# Patient Record
Sex: FEMALE | Race: ASIAN | Employment: UNEMPLOYED | URBAN - METROPOLITAN AREA
[De-identification: names, ages, dates, MRNs, and addresses within clinical notes are randomized per-mention and may not be internally consistent; named-entity substitution may affect disease eponyms.]

---

## 2017-01-04 ENCOUNTER — OFFICE VISIT (OUTPATIENT)
Dept: INTERNAL MEDICINE CLINIC | Age: 57
End: 2017-01-04

## 2017-01-04 VITALS
RESPIRATION RATE: 16 BRPM | WEIGHT: 151.2 LBS | TEMPERATURE: 97.8 F | DIASTOLIC BLOOD PRESSURE: 92 MMHG | BODY MASS INDEX: 30.48 KG/M2 | HEART RATE: 67 BPM | HEIGHT: 59 IN | SYSTOLIC BLOOD PRESSURE: 142 MMHG

## 2017-01-04 DIAGNOSIS — R73.02 IGT (IMPAIRED GLUCOSE TOLERANCE): ICD-10-CM

## 2017-01-04 DIAGNOSIS — E78.5 HYPERLIPIDEMIA, UNSPECIFIED HYPERLIPIDEMIA TYPE: Primary | ICD-10-CM

## 2017-01-04 NOTE — PROGRESS NOTES
RM 15  Pt presents today with her mom  Pt is not fasting    Chief Complaint   Patient presents with    Cholesterol Problem     follow up    Diabetes     pre-diabetes       1. Have you been to the ER, urgent care clinic since your last visit? Hospitalized since your last visit? No    2. Have you seen or consulted any other health care providers outside of the [address] [place] [address] since your last visit? Include any pap smears or colon screening. No    There are no preventive care reminders to display for this patient.

## 2017-01-04 NOTE — PROGRESS NOTES
HISTORY OF PRESENT ILLNESS  Elidia Kapoor is a 49 y.o. female. HPI  Presents for f/u IGT, GERD, lipids    Pt denies any further chest pain sx    Did not take the PPI, though. Right ankle/foot injury several years ago - pain and swelling  Never fully resolved   Intermittently feels pulsation or mild pain  But, no limitation in daily function    Past medical, Social, and Family history reviewed  Medications reviewed and updated. ROS  Complete ROS reviewed and negative or stable except as noted in HPI. Physical Exam   Constitutional: She is oriented to person, place, and time. She appears well-nourished. No distress. HENT:   Head: Normocephalic and atraumatic. Eyes: EOM are normal. Pupils are equal, round, and reactive to light. No scleral icterus. Neck: Normal range of motion. Neck supple. No JVD present. Cardiovascular: Normal rate, regular rhythm and normal heart sounds. Exam reveals no gallop and no friction rub. No murmur heard. Pulmonary/Chest: Effort normal and breath sounds normal. No respiratory distress. She has no wheezes. She has no rales. Abdominal: Soft. Bowel sounds are normal. She exhibits no distension. There is no tenderness. Musculoskeletal: Normal range of motion. She exhibits no edema. Lymphadenopathy:     She has no cervical adenopathy. Neurological: She is alert and oriented to person, place, and time. She exhibits normal muscle tone. Skin: Skin is warm. No rash noted. Psychiatric: She has a normal mood and affect. Nursing note and vitals reviewed. Prior labs reviewed with pt    ASSESSMENT and PLAN    ICD-10-CM ICD-9-CM    1. Hyperlipidemia, unspecified hyperlipidemia type E78.5 272.4    2. IGT (impaired glucose tolerance) R73.02 790.22    3. Elevated blood pressure I10 401.9      Follow-up Disposition:  Return in about 4 months (around 5/4/2017) for blood pressure, cholesterol, pre-diabetes.    results and schedule of future studies reviewed with patient  reviewed diet, exercise and weight  l  cardiovascular risk and specific lipid/LDL goals reviewed  reviewed medications and side effects in detail   Ok to hold PPI for now  If CP recurs will need to try PPI and consider cardiac stress testing  Encouraged diabetic and low saturated fat diet  Agree to TLC and hold on meds for lipids, HTN, or DM today but reviewed that each of these must be improved or med needed.   Consider ortho referral if foot/ankle symptoms worsen

## 2017-01-04 NOTE — MR AVS SNAPSHOT
Visit Information Date & Time Provider Department Dept. Phone Encounter #  
 1/4/2017 10:30 AM Shira Cornelius MD [facility] Pediatrics and Internal Medicine [phone] [id number] Follow-up Instructions Return in about 4 months (around 5/4/2017) for blood pressure, cholesterol, pre-diabetes. Upcoming Health Maintenance Date Due  
 PAP AKA CERVICAL CYTOLOGY 6/11/2018 BREAST CANCER SCRN MAMMOGRAM 12/14/2018 COLONOSCOPY 8/7/2025 DTaP/Tdap/Td series (2 - Td) 11/23/2026 Allergies as of 1/4/2017  Review Complete On: 1/4/2017 By: Shira Cornelius MD  
 No Known Allergies Current Immunizations  Reviewed on 1/4/2017 No immunizations on file. Reviewed by Shira Cornelius MD on 1/4/2017 at 12:03 PM  
You Were Diagnosed With   
  
 Codes Comments Hyperlipidemia, unspecified hyperlipidemia type    -  Primary ICD-10-CM: E78.5 ICD-9-CM: 272.4 IGT (impaired glucose tolerance)     ICD-10-CM: R73.02 
ICD-9-CM: 790.22 Elevated blood pressure     ICD-10-CM: I10 
ICD-9-CM: 401.9 Vitals BP Pulse Temp Resp Height(growth percentile) Weight(growth percentile) (!) 142/92 67 97.8 °F (36.6 °C) (Oral) 16 4' 11\" (1.499 m) 151 lb 3.2 oz (68.6 kg) BMI OB Status Smoking Status 30.54 kg/m2 Menopause Never Smoker Vitals History BMI and BSA Data Body Mass Index Body Surface Area 30.54 kg/m 2 1.69 m 2 Preferred Pharmacy Pharmacy Name Phone [facility] [phone] Your Updated Medication List  
  
   
This list is accurate as of: 1/4/17 12:09 PM.  Always use your most recent med list.  
  
  
  
  
 Omeprazole delayed release 20 mg tablet Commonly known as:  PRILOSEC D/R Take 1 Tab by mouth daily. vit B Cmplx 3-FA-Vit C-Biotin 1- mg-mg-mcg tablet Commonly known as:  NEPHRO KASSIDY RX Take 1 Tab by mouth daily. Follow-up Instructions Return in about 4 months (around 5/4/2017) for blood pressure, cholesterol, pre-diabetes. Introducing [facility] & HEALTH SERVICES! Ivy Mitchell introduces [employer] patient portal. Now you can access parts of your medical record, email your doctor's office, and request medication refills online. 1. In your internet browser, go to [email or web address] 2. Click on the First Time User? Click Here link in the Sign In box. You will see the New Member Sign Up page. 3. Enter your Rec[employer]lata Access Code exactly as it appears below. You will not need to use this code after youve completed the sign-up process. If you do not sign up before the expiration date, you must request a new code. · Rec[employer]ata Access Code: [email or web address] Expires: 2/21/2017  9:41 AM 
 
4. Enter the last four digits of your Social Security Number (xxxx) and Date of Birth (mm/dd/yyyy) as indicated and click Submit. You will be taken to the next sign-up page. 5. Create a Reciclata ID. This will be your Rec[employer]lata login ID and cannot be changed, so think of one that is secure and easy to remember. 6. Create a Rec[employer]lata password. You can change your password at any time. 7. Enter your Password Reset Question and Answer. This can be used at a later time if you forget your password. 8. Enter your e-mail address. You will receive e-mail notification when new information is available in [address] 9. Click Sign Up. You can now view and download portions of your medical record. 10. Click the Download Summary menu link to download a portable copy of your medical information. If you have questions, please visit the Frequently Asked Questions section of the [employer] website. Remember, [employer]ata is NOT to be used for urgent needs. For medical emergencies, dial 911. Now available from your iPhone and Android! Please provide this summary of care documentation to your next provider. Your primary care clinician is listed as [address] If you have any questions after today's visit, please call [phone].

## 2018-10-02 ENCOUNTER — OFFICE VISIT (OUTPATIENT)
Dept: INTERNAL MEDICINE CLINIC | Age: 58
End: 2018-10-02

## 2018-10-02 VITALS
HEIGHT: 59 IN | RESPIRATION RATE: 16 BRPM | SYSTOLIC BLOOD PRESSURE: 140 MMHG | HEART RATE: 66 BPM | DIASTOLIC BLOOD PRESSURE: 83 MMHG | WEIGHT: 148 LBS | OXYGEN SATURATION: 96 % | TEMPERATURE: 97.8 F | BODY MASS INDEX: 29.84 KG/M2

## 2018-10-02 DIAGNOSIS — R92.8 ABNORMAL MAMMOGRAM: ICD-10-CM

## 2018-10-02 DIAGNOSIS — R03.0 ELEVATED BLOOD PRESSURE READING: ICD-10-CM

## 2018-10-02 DIAGNOSIS — K21.9 GASTROESOPHAGEAL REFLUX DISEASE, ESOPHAGITIS PRESENCE NOT SPECIFIED: ICD-10-CM

## 2018-10-02 DIAGNOSIS — K55.20 AVM (ARTERIOVENOUS MALFORMATION) OF COLON: ICD-10-CM

## 2018-10-02 DIAGNOSIS — R73.02 IGT (IMPAIRED GLUCOSE TOLERANCE): ICD-10-CM

## 2018-10-02 DIAGNOSIS — E78.5 HYPERLIPIDEMIA, UNSPECIFIED HYPERLIPIDEMIA TYPE: ICD-10-CM

## 2018-10-02 DIAGNOSIS — Z82.49 FAMILY HISTORY OF THROMBOSIS IN FIRST DEGREE RELATIVE: ICD-10-CM

## 2018-10-02 DIAGNOSIS — R07.9 CHEST PAIN, UNSPECIFIED TYPE: ICD-10-CM

## 2018-10-02 DIAGNOSIS — Z00.00 WELL WOMAN EXAM (NO GYNECOLOGICAL EXAM): Primary | ICD-10-CM

## 2018-10-02 DIAGNOSIS — Z82.49 FAMILY HISTORY OF CEREBRAL ANEURYSM: ICD-10-CM

## 2018-10-02 RX ORDER — PHENOL/SODIUM PHENOLATE
20 AEROSOL, SPRAY (ML) MUCOUS MEMBRANE DAILY
Qty: 90 TAB | Refills: 1 | Status: SHIPPED | OUTPATIENT
Start: 2018-10-02

## 2018-10-02 NOTE — PROGRESS NOTES
Subjective:  
66 y.o. female for Well Woman Check. Her gyne and breast care is done elsewhere by her Ob-Gyne physician. Encounter facilitated by telephone  - [address] Past medical, Social, and Family history reviewed Medications reviewed and updated. ROS: Feeling generally well. No TIA's or unusual headaches, no dysphagia. No prolonged cough. No dyspnea or chest pain on exertion. No abdominal pain, change in bowel habits, black or bloody stools. No urinary tract symptoms. No new or unusual musculoskeletal symptoms. Specific concerns today:  
Reviewed PAP rec's - pt had normal PAP and neg HPV testing 2015 Pt inquires re: family hx blood clots Pt reports father  from blood clot in the brain Brother dx blood clot in the brain as well 2 yrs ago Unclear underlying etiology Pt also inquires re: heart testing 1-2 times per year develops chest pain Objective: The patient appears well, alert, oriented x 3, in no distress. Visit Vitals  /83 (BP 1 Location: Left arm, BP Patient Position: Sitting)  Pulse 66  Temp 97.8 °F (36.6 °C) (Oral)  Resp 16  
 Ht 4' 11\" (1.499 m) Comment: per chart  Wt 148 lb (67.1 kg)  SpO2 96%  BMI 29.89 kg/m2 ENT normal.  Neck supple. No adenopathy or thyromegaly. YENNY. Lungs are clear, good air entry, no wheezes, rhonchi or rales. S1 and S2 normal, no murmurs, regular rate and rhythm. Abdomen soft without tenderness, guarding, mass or organomegaly. Extremities show no edema, normal peripheral pulses. Neurological is normal, no focal findings. Breast and Pelvic exams are deferred. Cherry angiomas, spider veins Prior labs reviewed. Assessment/Plan:  
Well Woman 
follow low fat diet, routine labs ordered, call if any problems ICD-10-CM ICD-9-CM 1.  Well woman exam (no gynecological exam) Z00.00 V70.0 CBC WITH AUTOMATED DIFF  
   HEMOGLOBIN A1C WITH EAG  
   LIPID PANEL  
 METABOLIC PANEL, COMPREHENSIVE  
   VITAMIN D, 25 HYDROXY  
 [V70.0] 2. Hyperlipidemia, unspecified hyperlipidemia type E78.5 272.4 LIPID PANEL  
3. IGT (impaired glucose tolerance) R73.02 790.22 HEMOGLOBIN A1C WITH EAG  
4. Family history of thrombosis in first degree relative Z83.2 V18.3 JAK2 MUTATION ANALYSIS  
   FACTOR V LEIDEN  
   PROTEIN S PANEL  
   PROTEIN C DEFICIENCY  
   ANTITHROMBIN III ACTIVITY FACTOR II  DNA ANALYSIS  
   ANTIPHOSPHOLIPID SYNDROME PANEL  
   PROTHROMBIN TIME + INR  
   PTT 5. Family history of cerebral aneurysm Z82.49 V17.1 MRA BRAIN WO CONT 6. Chest pain, unspecified type R07.9 786.50 AMB POC EKG ROUTINE W/ 12 LEADS, INTER & REP  
   XR CHEST PA LAT  
   ECHO TTE STRESS EXERCISE TREADMILL COMP 7. Gastroesophageal reflux disease, esophagitis presence not specified K21.9 530.81 Omeprazole delayed release (PRILOSEC D/R) 20 mg tablet 8. 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   
9. Abnormal mammogram R92.8 793.80   
10. Elevated blood pressure reading R03.0 796.2 Follow-up Disposition: 
Return in about 2 months (around 12/2/2018), or if symptoms worsen or fail to improve, for chest pain, results. results and schedule of future studies reviewed with patient 
reviewed diet, exercise and weight  
cardiovascular risk and specific lipid/LDL goals reviewed 
reviewed medications and side effects in detail Family hx of clot vs aneurysm  
hypercoag w/u MRA brain Declines flu shot Reviewed Shingrix - but out of stock

## 2018-10-02 NOTE — PROGRESS NOTES
Rm 14 Chief Complaint Patient presents with  Complete Physical  
pt is fasting 1. Have you been to the ER, urgent care clinic since your last visit? Hospitalized since your last visit? No 
 
2. Have you seen or consulted any other health care providers outside of the [address] [place] [address] since your last visit? Include any pap smears or colon screening. No 
 
Health Maintenance Due Topic Date Due  Shingrix Vaccine Age 50> (1 of 2) 08/27/2012  PAP AKA CERVICAL CYTOLOGY  06/11/2018  Influenza Age 5 to Adult  08/01/2018  
pt declines flu vaccine today No recent pap smear Mammogram done 6/2018 per pt [facility] PHQ over the last two weeks 10/2/2018 Little interest or pleasure in doing things Not at all Feeling down, depressed, irritable, or hopeless Not at all Total Score PHQ 2 0 Learning Assessment 10/2/2018 PRIMARY LEARNER Patient HIGHEST LEVEL OF EDUCATION - PRIMARY LEARNER  -  
BARRIERS PRIMARY LEARNER LANGUAGE  
CO-LEARNER CAREGIVER No  
PRIMARY LANGUAGE CHINESE (MANDARIN) LEARNER PREFERENCE PRIMARY LISTENING  
ANSWERED BY patient RELATIONSHIP SELF

## 2018-10-02 NOTE — MR AVS SNAPSHOT
[address] [place] [address] [address] [place] [id number] 
[phone] Patient: Keshia Lloyd MRN: [id number] :1960 Visit Information Date & Time Provider Department Dept. Phone Encounter #  
 10/2/2018  9:30 AM Bry Borrego MD [facility] Pediatrics and Internal Medicine [phone] [id number] Follow-up Instructions Return in about 2 months (around 2018), or if symptoms worsen or fail to improve, for chest pain, results. Upcoming Health Maintenance Date Due Shingrix Vaccine Age 50> (1 of 2) 2012 Influenza Age 5 to Adult 2019* PAP AKA CERVICAL CYTOLOGY 2020 BREAST CANCER SCRN MAMMOGRAM 2020 COLONOSCOPY 2025 DTaP/Tdap/Td series (2 - Td) 2026 *Topic was postponed. The date shown is not the original due date. Allergies as of 10/2/2018  Review Complete On: 10/2/2018 By: Bry Borrego MD  
 No Known Allergies Current Immunizations  Reviewed on 10/2/2018 No immunizations on file. Reviewed by Bry Borrego MD on 10/2/2018 at 11:03 AM  
You Were Diagnosed With   
  
 Codes Comments Well woman exam (no gynecological exam)    -  Primary ICD-10-CM: Z00.00 ICD-9-CM: V70.0 [V70.0] Hyperlipidemia, unspecified hyperlipidemia type     ICD-10-CM: E78.5 ICD-9-CM: 272.4 IGT (impaired glucose tolerance)     ICD-10-CM: R73.02 
ICD-9-CM: 790.22 Family history of thrombosis in first degree relative     ICD-10-CM: Z83.2 ICD-9-CM: V18.3 Family history of cerebral aneurysm     ICD-10-CM: Z82.49 
ICD-9-CM: V17.1 Chest pain, unspecified type     ICD-10-CM: R07.9 ICD-9-CM: 786.50 Gastroesophageal reflux disease, esophagitis presence not specified     ICD-10-CM: K21.9 ICD-9-CM: 530.81 AVM (arteriovenous malformation) of colon     ICD-10-CM: Q27.33 
ICD-9-CM: 747.61 Abnormal mammogram     ICD-10-CM: R92.8 ICD-9-CM: 793.80 Elevated blood pressure reading     ICD-10-CM: R03.0 ICD-9-CM: 796.2 Vitals BP Pulse Temp Resp Height(growth percentile) Weight(growth percentile) 140/83 (BP 1 Location: Left arm, BP Patient Position: Sitting) 66 97.8 °F (36.6 °C) (Oral) 16 4' 11\" (1.499 m) 148 lb (67.1 kg) SpO2 BMI OB Status Smoking Status 96% 29.89 kg/m2 Menopause Never Smoker Vitals History BMI and BSA Data Body Mass Index Body Surface Area  
 29.89 kg/m 2 1.67 m 2 Preferred Pharmacy Pharmacy Name Phone [facility] AT [address] [place] [address] Drive [phone] Your Updated Medication List  
  
   
This list is accurate as of 10/2/18 11:50 AM.  Always use your most recent med list.  
  
  
  
  
 Omeprazole delayed release 20 mg tablet Commonly known as:  PRILOSEC D/R Take 1 Tab by mouth daily. vit B Cmplx 3-FA-Vit C-Biotin 1- mg-mg-mcg tablet Commonly known as:  NEPHRO KASSIDY RX Take 1 Tab by mouth daily. Prescriptions Sent to Pharmacy Refills Omeprazole delayed release (PRILOSEC D/R) 20 mg tablet 1 Sig: Take 1 Tab by mouth daily. Class: Normal  
 Pharmacy: [facility] [address] AT [address] [place] [address] Drive P Ph #: [phone] Route: Oral  
  
We Performed the Following AMB POC EKG ROUTINE W/ 12 LEADS, INTER & REP [46151 CPT(R)] ANTIPHOSPHOLIPID SYNDROME PANEL [[id number] Custom] ANTITHROMBIN III ACTIVITY [id number] CPT(R)] CBC WITH AUTOMATED DIFF [06810 CPT(R)] FACTOR II  DNA ANALYSIS [[id number] Custom] FACTOR V LEIDEN J1802556 CPT(R)] HEMOGLOBIN A1C WITH EAG [62780 CPT(R)] JAK2 MUTATION ANALYSIS [[id number] Custom] LIPID PANEL [25164 CPT(R)] METABOLIC PANEL, COMPREHENSIVE [93031 CPT(R)] PROTEIN C DEFICIENCY [[id number] Custom] PROTEIN S PANEL [id number] CPT(R)] PROTHROMBIN TIME + INR [71078 CPT(R)] PTT C5925415 CPT(R)] VITAMIN D, 25 HYDROXY [id number] CPT(R)] Follow-up Instructions Return in about 2 months (around 12/2/2018), or if symptoms worsen or fail to improve, for chest pain, results. To-Do List   
 10/03/2018 Imaging:  XR CHEST PA LAT   
  
 10/09/2018 Imaging:  MRA BRAIN WO CONT   
  
 10/16/2018 ECHO:  ECHO TTE STRESS EXERCISE TREADMILL COMP Introducing \[facility]\"" & HEALTH SERVICES! Ariana Velez introduces [employer] patient portal. Now you can access parts of your medical record, email your doctor's office, and request medication refills online. 1. In your internet browser, go to [email or web address] 2. Click on the First Time User? Click Here link in the Sign In box. You will see the New Member Sign Up page. 3. Enter your [employer] Access Code exactly as it appears below. You will not need to use this code after youve completed the sign-up process. If you do not sign up before the expiration date, you must request a new code. · [employer] Access Code: [email or web address] Expires: 12/31/2018  9:49 AM 
 
4. Enter the last four digits of your Social Security Number (xxxx) and Date of Birth (mm/dd/yyyy) as indicated and click Submit. You will be taken to the next sign-up page. 5. Create a [employer] ID. This will be your [employer] login ID and cannot be changed, so think of one that is secure and easy to remember. 6. Create a [employer] password. You can change your password at any time. 7. Enter your Password Reset Question and Answer. This can be used at a later time if you forget your password. 8. Enter your e-mail address. You will receive e-mail notification when new information is available in [address] 9. Click Sign Up. You can now view and download portions of your medical record. 10. Click the Download Summary menu link to download a portable copy of your medical information. If you have questions, please visit the Frequently Asked Questions section of the [employer] website. Remember, [employer] is NOT to be used for urgent needs. For medical emergencies, dial 911. Now available from your iPhone and Android! Please provide this summary of care documentation to your next provider. Your primary care clinician is listed as [address] If you have any questions after today's visit, please call [phone].

## 2018-10-02 NOTE — PROGRESS NOTES
HPI: 
Presents for f/u chest pain, other Encounter facilitated by telephone  - [address] Chest pain Drinking warm water resolves the sx Pt concerned re: heart 
 
fam hx cerebral thrombosis vs aneurysm - father and brother Pt inquires re: testing to clarify her risk. Past medical, Social, and Family history reviewed Prior to Admission medications Medication Sig Start Date End Date Taking? Authorizing Provider Omeprazole delayed release (PRILOSEC D/R) 20 mg tablet Take 1 Tab by mouth daily. 11/23/16  Yes Agustin Taylor MD  
vit B Cmplx 3-FA-Vit C-Biotin (NEPHRO KASSIDY RX) 1- mg-mg-mcg tablet Take 1 Tab by mouth daily. Yes Historical Provider ROS Complete ROS reviewed and negative or stable except as noted in HPI. Physical Exam  
Constitutional: She is oriented to person, place, and time. She appears well-nourished. No distress. HENT:  
Head: Normocephalic and atraumatic. Eyes: EOM are normal. Pupils are equal, round, and reactive to light. No scleral icterus. Neck: Normal range of motion. Neck supple. No JVD present. Cardiovascular: Normal rate, regular rhythm and normal heart sounds. Exam reveals no gallop and no friction rub. No murmur heard. Pulmonary/Chest: Effort normal and breath sounds normal. No respiratory distress. She has no wheezes. She has no rales. Abdominal: Soft. Bowel sounds are normal. She exhibits no distension. There is no tenderness. Musculoskeletal: Normal range of motion. She exhibits no edema. Lymphadenopathy:  
  She has no cervical adenopathy. Neurological: She is alert and oriented to person, place, and time. She exhibits normal muscle tone. Skin: Skin is warm. No rash noted. Spiders veins, cherry angiomas, birthmark (vascular) on right forearm. Psychiatric: She has a normal mood and affect. Nursing note and vitals reviewed. Prior labs reviewed. Assessment/Plan: ICD-10-CM ICD-9-CM 1. Chest pain, unspecified type R07.9 786.50 AMB POC EKG ROUTINE W/ 12 LEADS, INTER & REP  
   XR CHEST PA LAT  
   ECHO TTE STRESS EXERCISE TREADMILL COMP 2. Hyperlipidemia, unspecified hyperlipidemia type E78.5 272.4 LIPID PANEL  
3. IGT (impaired glucose tolerance) R73.02 790.22 HEMOGLOBIN A1C WITH EAG 4. Well woman exam (no gynecological exam) Z00.00 V70.0 CBC WITH AUTOMATED DIFF  
   HEMOGLOBIN A1C WITH EAG  
   LIPID PANEL  
   METABOLIC PANEL, COMPREHENSIVE  
   VITAMIN D, 25 HYDROXY  
 [V70.0] 5. Family history of thrombosis in first degree relative Z83.2 V18.3 JAK2 MUTATION ANALYSIS  
   FACTOR V LEIDEN  
   PROTEIN S PANEL  
   PROTEIN C DEFICIENCY  
   ANTITHROMBIN III ACTIVITY FACTOR II  DNA ANALYSIS  
   ANTIPHOSPHOLIPID SYNDROME PANEL  
   PROTHROMBIN TIME + INR  
   PTT 6. Family history of cerebral aneurysm Z82.49 V17.1 MRA BRAIN WO CONT 7. Gastroesophageal reflux disease, esophagitis presence not specified K21.9 530.81 Omeprazole delayed release (PRILOSEC D/R) 20 mg tablet 8. 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   
9. Abnormal mammogram R92.8 793.80   
10. Elevated blood pressure reading R03.0 796.2 Follow-up Disposition: 
Return in about 2 months (around 12/2/2018), or if symptoms worsen or fail to improve, for chest pain, results. results and schedule of future studies reviewed with patient 
reviewed diet, exercise and weight 
cardiovascular risk and specific lipid/LDL goals reviewed 
reviewed medications and side effects in detail Stress echo PPI rec'd Reviewed pathophysiology of GERD and esophageal spasm EKG CXR 
hypercoag w/u MRA brain Likely will need HTN, lipid, and DM meds at some point - defer today >40 minutes time spent with >50% in counseling and coordination of care

## 2018-10-03 LAB — SPECIMEN STATUS REPORT, ROLRST: NORMAL

## 2018-10-10 LAB
25(OH)D3+25(OH)D2 SERPL-MCNC: 28.5 NG/ML (ref 30–100)
ALBUMIN SERPL-MCNC: 4.8 G/DL (ref 3.5–5.5)
ALBUMIN/GLOB SERPL: 1.9 {RATIO} (ref 1.2–2.2)
ALP SERPL-CCNC: 76 IU/L (ref 39–117)
ALT SERPL-CCNC: 17 IU/L (ref 0–32)
APTT HEX PL PPP: NORMAL SEC
APTT IMM NP PPP: NORMAL SEC
APTT PPP 1:1 SALINE: NORMAL SEC
APTT PPP: 30 SEC (ref 24–33)
APTT PPP: NORMAL SEC
AST SERPL-CCNC: 23 IU/L (ref 0–40)
AT III ACT/NOR PPP CHRO: NORMAL %
B2 GLYCOPROT1 IGA SER-ACNC: NORMAL SAU
B2 GLYCOPROT1 IGG SER-ACNC: NORMAL SGU
B2 GLYCOPROT1 IGM SER-ACNC: NORMAL SMU
BACKGROUND: 480503: NORMAL
BASOPHILS # BLD AUTO: 0 X10E3/UL (ref 0–0.2)
BASOPHILS NFR BLD AUTO: 1 %
BILIRUB SERPL-MCNC: 0.6 MG/DL (ref 0–1.2)
BUN SERPL-MCNC: 11 MG/DL (ref 6–24)
BUN/CREAT SERPL: 20 (ref 9–23)
CALCIUM SERPL-MCNC: 9.5 MG/DL (ref 8.7–10.2)
CARDIOLIPIN IGA SER IA-ACNC: NORMAL APL
CARDIOLIPIN IGG SER IA-ACNC: NORMAL GPL
CARDIOLIPIN IGM SER IA-ACNC: NORMAL MPL
CHLORIDE SERPL-SCNC: 103 MMOL/L (ref 96–106)
CHOLEST SERPL-MCNC: 207 MG/DL (ref 100–199)
CO2 SERPL-SCNC: 19 MMOL/L (ref 20–29)
CONFIRM DRVVT: NORMAL SEC
CREAT SERPL-MCNC: 0.56 MG/DL (ref 0.57–1)
DRVVT SCREEN TO CONFIRM RATIO: NORMAL RATIO
EOSINOPHIL # BLD AUTO: 0.1 X10E3/UL (ref 0–0.4)
EOSINOPHIL NFR BLD AUTO: 1 %
ERYTHROCYTE [DISTWIDTH] IN BLOOD BY AUTOMATED COUNT: 14.2 % (ref 12.3–15.4)
EST. AVERAGE GLUCOSE BLD GHB EST-MCNC: 128 MG/DL
F2 GENE MUT ANL BLD/T: NORMAL
F5 GENE MUT ANL BLD/T: NORMAL
GLOBULIN SER CALC-MCNC: 2.5 G/DL (ref 1.5–4.5)
GLUCOSE SERPL-MCNC: 106 MG/DL (ref 65–99)
HBA1C MFR BLD: 6.1 % (ref 4.8–5.6)
HCT VFR BLD AUTO: 39.1 % (ref 34–46.6)
HDLC SERPL-MCNC: 58 MG/DL
HGB BLD-MCNC: 12.3 G/DL (ref 11.1–15.9)
IMM GRANULOCYTES # BLD: 0 X10E3/UL (ref 0–0.1)
IMM GRANULOCYTES NFR BLD: 0 %
INR PPP: 1 (ref 0.8–1.2)
JAK2 P.V617F BLD/T QL: NORMAL
LA NT PLATELET PPP: NORMAL SEC
LA PPP-IMP: NORMAL
LAB DIRECTOR NAME PROVIDER: NORMAL
LDLC SERPL CALC-MCNC: 132 MG/DL (ref 0–99)
LYMPHOCYTES # BLD AUTO: 1.4 X10E3/UL (ref 0.7–3.1)
LYMPHOCYTES NFR BLD AUTO: 35 %
MCH RBC QN AUTO: 28.7 PG (ref 26.6–33)
MCHC RBC AUTO-ENTMCNC: 31.5 G/DL (ref 31.5–35.7)
MCV RBC AUTO: 91 FL (ref 79–97)
MONOCYTES # BLD AUTO: 0.3 X10E3/UL (ref 0.1–0.9)
MONOCYTES NFR BLD AUTO: 8 %
NEUTROPHILS # BLD AUTO: 2.2 X10E3/UL (ref 1.4–7)
NEUTROPHILS NFR BLD AUTO: 55 %
PLATELET # BLD AUTO: 186 X10E3/UL (ref 150–379)
POTASSIUM SERPL-SCNC: 3.7 MMOL/L (ref 3.5–5.2)
PROT C ACT/NOR PPP: NORMAL %
PROT C AG ACT/NOR PPP IA: NORMAL %
PROT S ACT/NOR PPP: NORMAL %
PROT S AG ACT/NOR PPP IA: NORMAL %
PROT S FREE AG ACT/NOR PPP IA: NORMAL %
PROT SERPL-MCNC: 7.3 G/DL (ref 6–8.5)
PROTHROM IGG SERPL-ACNC: NORMAL G UNITS
PROTHROMBIN TIME: 10.3 SEC (ref 9.1–12)
PS IGG SER IA-ACNC: NORMAL GPS
PS IGM SER IA-ACNC: NORMAL MPS
RBC # BLD AUTO: 4.28 X10E6/UL (ref 3.77–5.28)
SCREEN DRVVT: NORMAL SEC
SODIUM SERPL-SCNC: 141 MMOL/L (ref 134–144)
TRIGL SERPL-MCNC: 83 MG/DL (ref 0–149)
VLDLC SERPL CALC-MCNC: 17 MG/DL (ref 5–40)
WBC # BLD AUTO: 4 X10E3/UL (ref 3.4–10.8)

## 2018-10-24 NOTE — PROGRESS NOTES
Please notify pt of results HgbA1C at 6.1 = pre-diabetes range. Continue a low carb, no sugar added diet. No medication needed for blood sugar at this time. LDL cholesterol at 132 is too high for someone with pre-diabetes. Should be < 100. If a low saturated fat diet and exercise can not lower it, then she should consider a cholesterol lowering medication. Vitamin D is a little low at 28.5 (normal is greater than 30). Increase her daily vitamin D by 1000 units per day. The blood clotting labs that were performed are normal.  Several labs were not processed due to error in collection. Offer that she either return for these or may go to [facility] for them to complete the workup. (see labs listed as cancelled) Verify that the  collects properly with the repeat blood draw.

## 2018-12-06 ENCOUNTER — OFFICE VISIT (OUTPATIENT)
Dept: INTERNAL MEDICINE CLINIC | Age: 58
End: 2018-12-06

## 2018-12-06 VITALS
RESPIRATION RATE: 16 BRPM | HEART RATE: 71 BPM | HEIGHT: 59 IN | BODY MASS INDEX: 30.28 KG/M2 | SYSTOLIC BLOOD PRESSURE: 137 MMHG | OXYGEN SATURATION: 98 % | TEMPERATURE: 97.7 F | WEIGHT: 150.2 LBS | DIASTOLIC BLOOD PRESSURE: 83 MMHG

## 2018-12-06 DIAGNOSIS — R03.0 ELEVATED BLOOD PRESSURE READING: ICD-10-CM

## 2018-12-06 DIAGNOSIS — K55.20 AVM (ARTERIOVENOUS MALFORMATION) OF COLON: ICD-10-CM

## 2018-12-06 DIAGNOSIS — K21.9 GASTROESOPHAGEAL REFLUX DISEASE, ESOPHAGITIS PRESENCE NOT SPECIFIED: ICD-10-CM

## 2018-12-06 DIAGNOSIS — Z82.49 FAMILY HISTORY OF CEREBRAL ANEURYSM: ICD-10-CM

## 2018-12-06 DIAGNOSIS — E78.5 HYPERLIPIDEMIA, UNSPECIFIED HYPERLIPIDEMIA TYPE: ICD-10-CM

## 2018-12-06 DIAGNOSIS — Z82.49 FAMILY HISTORY OF THROMBOEMBOLIC DISEASE: ICD-10-CM

## 2018-12-06 DIAGNOSIS — R73.02 IGT (IMPAIRED GLUCOSE TOLERANCE): ICD-10-CM

## 2018-12-06 DIAGNOSIS — R07.9 CHEST PAIN, UNSPECIFIED TYPE: Primary | ICD-10-CM

## 2018-12-06 DIAGNOSIS — R06.83 SNORING: ICD-10-CM

## 2018-12-06 DIAGNOSIS — R06.81 WITNESSED APNEIC SPELLS: ICD-10-CM

## 2018-12-06 RX ORDER — PHENOL/SODIUM PHENOLATE
20 AEROSOL, SPRAY (ML) MUCOUS MEMBRANE DAILY
Qty: 30 TAB | Refills: 5 | Status: SHIPPED | OUTPATIENT
Start: 2018-12-06

## 2018-12-06 NOTE — PATIENT INSTRUCTIONS
HgbA1C at 6.1 = pre-diabetes range.  Continue a low carb, no sugar added diet.  No medication needed for blood sugar at this time. LDL cholesterol at 132 is too high for someone with pre-diabetes.  Should be < 100.  If a low saturated fat diet and exercise can not lower it, then she should consider a cholesterol lowering medication. Vitamin D is a little low at 28.5 (normal is greater than 30).  Increase her daily vitamin D by 1000 units per day.

## 2018-12-06 NOTE — LETTER
12/6/2018 12:56 PM 
 
Ms. Freddie Whaley [address] [id number] List of current medication Prior to Admission medications Medication Sig Start Date End Date Taking? Authorizing Provider Omeprazole delayed release (PRILOSEC D/R) 20 mg tablet Take 1 Tab by mouth daily. 12/6/18  Yes Zachariah Valdez MD  
Omeprazole delayed release (PRILOSEC D/R) 20 mg tablet Take 1 Tab by mouth daily. 10/2/18  Yes Zachariah Valdez MD  
vit B Cmplx 3-FA-Vit C-Biotin (NEPHRO KASSIDY RX) 1- mg-mg-mcg tablet Take 1 Tab by mouth daily. Yes Provider, Historical  
 
 
 
 
 
 
 
Sincerely, Damir Mcnamara MD

## 2018-12-06 NOTE — PROGRESS NOTES
Rm 13   Mandarin chinese speaking,  was not needed for nurse checking. Chief Complaint   Patient presents with    Chest Pain     f/u     1. Have you been to the ER, urgent care clinic since your last visit? Hospitalized since your last visit? No    2. Have you seen or consulted any other health care providers outside of the [address] [place] [address] since your last visit? Include any pap smears or colon screening.  No    Health Maintenance Due   Topic Date Due    Shingrix Vaccine Age 49> (1 of 2) 08/27/2012     PHQ over the last two weeks 10/2/2018   Little interest or pleasure in doing things Not at all   Feeling down, depressed, irritable, or hopeless Not at all   Total Score PHQ 2 0     Learning Assessment 10/2/2018   PRIMARY LEARNER Patient   HIGHEST LEVEL OF EDUCATION - PRIMARY LEARNER  -   BARRIERS PRIMARY LEARNER LANGUAGE   CO-LEARNER CAREGIVER No   PRIMARY LANGUAGE CHINESE (MANDARIN)   LEARNER PREFERENCE PRIMARY LISTENING   ANSWERED BY patient   RELATIONSHIP SELF

## 2018-12-06 NOTE — PROGRESS NOTES
HPI:  Presents for f/u CP, family hx, results. Encounter facilitated by telephone   - Mandarin chinese # [id number]    Pt never received lab results. No imaging or stress scheduled - notes show a call was made, message left 10/4/18 at 10:45 am.    Pt cites that any phone messages left were not addressed since she does not speak English well    Pt still has intermittent chest pains  Not fully related to food each time    Has used intermittent PPI dosing in the past, never consistent dosing    Pt reports rare palpitations. 1-2 times in the past month or so. No SOB  No provoking factors known    Pt also inquires re: snoring   reports witnessed apnea    Past medical, Social, and Family history reviewed    Prior to Admission medications    Medication Sig Start Date End Date Taking? Authorizing Provider   Omeprazole delayed release (PRILOSEC D/R) 20 mg tablet Take 1 Tab by mouth daily. 10/2/18  Yes Michelle Hawkins MD   vit B Cmplx 3-FA-Vit C-Biotin (NEPHRO KASSIDY RX) 1- mg-mg-mcg tablet Take 1 Tab by mouth daily. Yes Provider, Historical          ROS  Complete ROS reviewed and negative or stable except as noted in HPI. Physical Exam   Constitutional: She is oriented to person, place, and time. She appears well-nourished. No distress. HENT:   Head: Normocephalic and atraumatic. Eyes: EOM are normal. Pupils are equal, round, and reactive to light. No scleral icterus. Neck: Normal range of motion. Neck supple. No JVD present. Cardiovascular: Normal rate, regular rhythm and normal heart sounds. Exam reveals no gallop and no friction rub. No murmur heard. Pulmonary/Chest: Effort normal and breath sounds normal. No respiratory distress. She has no wheezes. She has no rales. Abdominal: Soft. Bowel sounds are normal. She exhibits no distension. There is no tenderness. Musculoskeletal: Normal range of motion. She exhibits no edema.    Lymphadenopathy:     She has no cervical adenopathy. Neurological: She is alert and oriented to person, place, and time. She exhibits normal muscle tone. Skin: Skin is warm. No rash noted. Spiders veins, cherry angiomas, birthmark (vascular) on right forearm. Psychiatric: She has a normal mood and affect. Nursing note and vitals reviewed. Prior labs reviewed in detail. Assessment/Plan:    ICD-10-CM ICD-9-CM    1. Chest pain, unspecified type R07.9 786.50 ECHO TTE STRESS EXERCISE TREADMILL COMP      ECHO TTE STRESS COMP W OR WO CONTR   2. Family history of thromboembolic disease J30.60 I33.2 PROTEIN S PANEL      PROTEIN C DEFICIENCY      ANTITHROMBIN III ACTIVITY      ANTIPHOSPHOLIPID SYNDROME PANEL   3. Family history of cerebral aneurysm Z82.49 V17.1 MRA BRAIN WO CONT   4. Hyperlipidemia, unspecified hyperlipidemia type E78.5 272.4    5. IGT (impaired glucose tolerance) R73.02 790.22    6. Elevated blood pressure reading R03.0 796.2 SLEEP MEDICINE REFERRAL   7. AVM (arteriovenous malformation) of colon Q27.33 747.61    8. Gastroesophageal reflux disease, esophagitis presence not specified K21.9 530.81 Omeprazole delayed release (PRILOSEC D/R) 20 mg tablet   9. Snoring R06.83 786.09 SLEEP MEDICINE REFERRAL   10. Witnessed apneic spells R06.81 786.03 SLEEP MEDICINE REFERRAL     Follow-up Disposition:  Return in about 4 months (around 4/6/2019), or if symptoms worsen or fail to improve, for blood pressure, cholesterol, pre-diabetes. results and schedule of future studies reviewed with patient  reviewed diet, exercise and weight   cardiovascular risk and specific lipid/LDL goals reviewed  reviewed medications and side effects in detail   Repeat the labs that were not properly   Send lab letter since phone call not understood  Re-order stress echo and MRA and assist with scheduling  Will need to clarify her preferred means of communication so that medical info and scheduling are not missed - pt requests text or letter.   Encouraged to sign up for mychart.   Encouraged diet change and TLC  Repeat lipids an HgbA1c in a few months  Hold meds for now for lipids, BP, glycemic control  Trial of PPI, TUMS prn  Ref for sleep consult and consider home sleep study         >40 minutes time spent with >50% in counseling and coordination of care

## 2018-12-17 LAB
APTT HEX PL PPP: 11 SEC
APTT IMM NP PPP: NORMAL SEC
APTT PPP 1:1 SALINE: NORMAL SEC
APTT PPP: 29.5 SEC
AT III ACT/NOR PPP CHRO: 113 % (ref 75–135)
B2 GLYCOPROT1 IGA SER-ACNC: <10 SAU
B2 GLYCOPROT1 IGG SER-ACNC: <10 SGU
B2 GLYCOPROT1 IGM SER-ACNC: <10 SMU
CARDIOLIPIN IGA SER IA-ACNC: <10 APL
CARDIOLIPIN IGG SER IA-ACNC: <10 GPL
CARDIOLIPIN IGM SER IA-ACNC: 12 MPL
CONFIRM DRVVT: NORMAL SEC
DRVVT SCREEN TO CONFIRM RATIO: NORMAL RATIO
LA NT PLATELET PPP: 0.6 SEC
LA PPP-IMP: NORMAL
PROT C ACT/NOR PPP: 142 % (ref 73–180)
PROT C AG ACT/NOR PPP IA: 101 % (ref 60–150)
PROT S ACT/NOR PPP: 96 % (ref 63–140)
PROT S AG ACT/NOR PPP IA: 111 % (ref 60–150)
PROT S FREE AG ACT/NOR PPP IA: 101 % (ref 57–157)
PROTHROM IGG SERPL-ACNC: 3 G UNITS
PS IGG SER IA-ACNC: 6 GPS
PS IGM SER IA-ACNC: 1 MPS
SCREEN DRVVT: 31.3 SEC

## 2019-01-04 ENCOUNTER — HOSPITAL ENCOUNTER (OUTPATIENT)
Dept: NON INVASIVE DIAGNOSTICS | Age: 59
Discharge: HOME | End: 2019-01-04
Attending: INTERNAL MEDICINE
Payer: COMMERCIAL

## 2019-01-04 ENCOUNTER — HOSPITAL ENCOUNTER (OUTPATIENT)
Dept: MRI IMAGING | Age: 59
Discharge: HOME | End: 2019-01-04
Attending: INTERNAL MEDICINE
Payer: COMMERCIAL

## 2019-01-04 DIAGNOSIS — R07.9 CHEST PAIN, UNSPECIFIED TYPE: ICD-10-CM

## 2019-01-04 DIAGNOSIS — Z82.49 FAMILY HISTORY OF CEREBRAL ANEURYSM: ICD-10-CM

## 2019-01-04 LAB
ATTENDING PHYSICIAN, CST07: NORMAL
DIAGNOSIS, 93000: NORMAL
DUKE TM SCORE RESULT, CST14: NORMAL
DUKE TREADMILL SCORE, CST13: NORMAL
ECG INTERP BEFORE EX, CST11: NORMAL
ECG INTERP DURING EX, CST12: NORMAL
FUNCTIONAL CAPACITY, CST17: NORMAL
KNOWN CARDIAC CONDITION, CST08: NORMAL
MAX. DIASTOLIC BP, CST04: 80 MMHG
MAX. HEART RATE, CST05: 146 BPM
MAX. SYSTOLIC BP, CST03: 170 MMHG
OVERALL BP RESPONSE TO EXERCISE, CST16: NORMAL
OVERALL HR RESPONSE TO EXERCISE, CST15: NORMAL
PEAK EX METS, CST10: 9.2 METS
PROTOCOL NAME, CST01: NORMAL
REASON FOR TERMINATION: 71 BPM
TEST INDICATION, CST09: NORMAL
TIME IN EXERCISE PHASE, CST02: NORMAL

## 2019-01-04 PROCEDURE — 70544 MR ANGIOGRAPHY HEAD W/O DYE: CPT

## 2019-01-04 PROCEDURE — 96374 THER/PROPH/DIAG INJ IV PUSH: CPT

## 2019-02-25 ENCOUNTER — CLINICAL SUPPORT (OUTPATIENT)
Dept: INTERNAL MEDICINE CLINIC | Age: 59
End: 2019-02-25

## 2019-02-25 VITALS
OXYGEN SATURATION: 95 % | HEIGHT: 59 IN | RESPIRATION RATE: 16 BRPM | HEART RATE: 75 BPM | SYSTOLIC BLOOD PRESSURE: 123 MMHG | BODY MASS INDEX: 30.56 KG/M2 | WEIGHT: 151.6 LBS | TEMPERATURE: 98 F | DIASTOLIC BLOOD PRESSURE: 79 MMHG

## 2019-02-25 DIAGNOSIS — Z11.1 SCREENING-PULMONARY TB: Primary | ICD-10-CM

## 2019-02-25 NOTE — LETTER
2/27/2019 4:03 PM 
 
Ms. Jcarlos Martinez [address] [facility] [place] [address] Results for orders placed or performed in visit on 02/25/19 AMB POC TUBERCULOSIS, INTRADERMAL (SKIN TEST) Result Value Ref Range PPD negative Negative  
 mm Induration 0 mm Sincerely, Abraham Donnelly MD

## 2019-02-25 NOTE — LETTER
Name: Shukri Balbeuna   Sex: female   : 1960  
[address] [place] [address] [address] 
[phone] (home) Current Immunizations: 
Immunization History Administered Date(s) Administered  TB Skin Test (PPD) Intradermal 2019 Allergies: Allergies as of 2019  (No Known Allergies)

## 2019-02-25 NOTE — PROGRESS NOTES
RM 13    PPD Placement note    Daniel Form, 66 y.o. female is here today for placement of PPD. PPD placed on 2/25/2019 to left forearm at 3pm.  Patient advised to return for reading within 48-72 hours. Patient advised to return on Wed (2/27/19) at or after 3pm to have PPD read. Patient agrees and voices understanding to return in 48hrs prior to leaving office.

## 2019-02-27 LAB
MM INDURATION POC: 0 MM (ref 0–5)
PPD POC: NEGATIVE NEGATIVE

## 2022-03-18 PROBLEM — R03.0 ELEVATED BLOOD PRESSURE READING: Status: ACTIVE | Noted: 2017-01-04

## 2023-12-19 LAB — MAMMOGRAPHY, EXTERNAL: NORMAL
